# Patient Record
Sex: FEMALE | URBAN - METROPOLITAN AREA
[De-identification: names, ages, dates, MRNs, and addresses within clinical notes are randomized per-mention and may not be internally consistent; named-entity substitution may affect disease eponyms.]

---

## 2023-12-07 ENCOUNTER — OFFICE VISIT (OUTPATIENT)
Dept: URBAN - METROPOLITAN AREA CLINIC 40 | Facility: CLINIC | Age: 43
End: 2023-12-07

## 2023-12-07 NOTE — HPI-TODAY'S VISIT:
Patient presents to the office today for evaluation of transaminitis.  Labs from November 21, 2023 from primary medical doctor include AST of 172, ALT 72, normal alkaline phosphatase and total bilirubin borderline at 1.4.  CBC essentially normal with normal H&H.  MCV was elevated at 103.  Platelets normal.